# Patient Record
Sex: MALE | Race: WHITE | NOT HISPANIC OR LATINO | Employment: FULL TIME | URBAN - METROPOLITAN AREA
[De-identification: names, ages, dates, MRNs, and addresses within clinical notes are randomized per-mention and may not be internally consistent; named-entity substitution may affect disease eponyms.]

---

## 2024-07-09 ENCOUNTER — TELEPHONE (OUTPATIENT)
Dept: PLASTIC SURGERY | Facility: CLINIC | Age: 44
End: 2024-07-09

## 2024-07-09 NOTE — TELEPHONE ENCOUNTER
[facility] Call Center    Phone Message    May a detailed message be left on voicemail: yes     Reason for Call: Other: New patient // Top Surgery Revision // FTM // prefers he him pronouns // Please call to schedule     Action Taken: Message routed to:  [facility] ([facility]): GENDER CARE    Travel Screening: Not Applicable     Date of Service:

## 2024-07-10 NOTE — CONFIDENTIAL NOTE
Writer called pt back and relayed that we are not taking new top surgery patients. Writer offered 2026 consult waitlist and list of referrals but pt said he has what he needs.